# Patient Record
Sex: FEMALE | Race: WHITE | NOT HISPANIC OR LATINO | Employment: OTHER | URBAN - METROPOLITAN AREA
[De-identification: names, ages, dates, MRNs, and addresses within clinical notes are randomized per-mention and may not be internally consistent; named-entity substitution may affect disease eponyms.]

---

## 2017-06-09 ENCOUNTER — HOSPITAL ENCOUNTER (OUTPATIENT)
Dept: MAMMOGRAPHY | Facility: HOSPITAL | Age: 62
Discharge: HOME | End: 2017-06-09

## 2017-06-09 DIAGNOSIS — Z12.31 VISIT FOR SCREENING MAMMOGRAM: ICD-10-CM

## 2021-05-25 ENCOUNTER — RECORDS - HEALTHEAST (OUTPATIENT)
Dept: ADMINISTRATIVE | Facility: CLINIC | Age: 66
End: 2021-05-25

## 2021-05-26 ENCOUNTER — RECORDS - HEALTHEAST (OUTPATIENT)
Dept: ADMINISTRATIVE | Facility: CLINIC | Age: 66
End: 2021-05-26

## 2021-05-27 ENCOUNTER — RECORDS - HEALTHEAST (OUTPATIENT)
Dept: ADMINISTRATIVE | Facility: CLINIC | Age: 66
End: 2021-05-27

## 2021-05-28 ENCOUNTER — RECORDS - HEALTHEAST (OUTPATIENT)
Dept: ADMINISTRATIVE | Facility: CLINIC | Age: 66
End: 2021-05-28

## 2021-07-13 ENCOUNTER — RECORDS - HEALTHEAST (OUTPATIENT)
Dept: ADMINISTRATIVE | Facility: CLINIC | Age: 66
End: 2021-07-13

## 2021-07-21 ENCOUNTER — RECORDS - HEALTHEAST (OUTPATIENT)
Dept: ADMINISTRATIVE | Facility: CLINIC | Age: 66
End: 2021-07-21

## 2021-09-16 ENCOUNTER — HOSPITAL ENCOUNTER (OUTPATIENT)
Dept: MRI IMAGING | Facility: CLINIC | Age: 66
Discharge: HOME | End: 2021-09-16
Attending: INTERNAL MEDICINE | Admitting: INTERNAL MEDICINE
Payer: COMMERCIAL

## 2021-09-16 DIAGNOSIS — K52.9 CHRONIC COLITIS: ICD-10-CM

## 2021-09-16 PROCEDURE — 250N000011 HC RX IP 250 OP 636: Performed by: INTERNAL MEDICINE

## 2021-09-16 PROCEDURE — A9585 GADOBUTROL INJECTION: HCPCS | Performed by: INTERNAL MEDICINE

## 2021-09-16 PROCEDURE — 255N000002 HC RX 255 OP 636: Performed by: INTERNAL MEDICINE

## 2021-09-16 PROCEDURE — 74183 MRI ABD W/O CNTR FLWD CNTR: CPT

## 2021-09-16 RX ORDER — GADOBUTROL 604.72 MG/ML
8 INJECTION INTRAVENOUS ONCE
Status: COMPLETED | OUTPATIENT
Start: 2021-09-16 | End: 2021-09-16

## 2021-09-16 RX ADMIN — GADOBUTROL 8 ML: 604.72 INJECTION INTRAVENOUS at 10:02

## 2021-09-16 RX ADMIN — GLUCAGON HYDROCHLORIDE 0.5 MG: KIT at 09:24

## 2021-09-16 RX ADMIN — GLUCAGON HYDROCHLORIDE 0.5 MG: KIT at 09:59

## 2021-09-16 NOTE — IR NOTE
Patient is not diabetic. Does not have insulinoma or pheochromocytoma. Glucagon given per protocol.

## 2025-06-03 LAB
ALBUMIN SERPL BCG-MCNC: 4.6 G/DL (ref 3.5–5.2)
ALP SERPL-CCNC: 74 U/L (ref 40–150)
ALT SERPL W P-5'-P-CCNC: 37 U/L (ref 0–50)
ANION GAP SERPL CALCULATED.3IONS-SCNC: 14 MMOL/L (ref 7–15)
AST SERPL W P-5'-P-CCNC: 37 U/L (ref 0–45)
ATRIAL RATE - MUSE: 74 BPM
BASOPHILS # BLD AUTO: 0.1 10E3/UL (ref 0–0.2)
BASOPHILS NFR BLD AUTO: 1 %
BILIRUB SERPL-MCNC: 0.7 MG/DL
BUN SERPL-MCNC: 13.6 MG/DL (ref 8–23)
CALCIUM SERPL-MCNC: 10.5 MG/DL (ref 8.8–10.4)
CHLORIDE SERPL-SCNC: 97 MMOL/L (ref 98–107)
CREAT SERPL-MCNC: 0.62 MG/DL (ref 0.51–0.95)
DIASTOLIC BLOOD PRESSURE - MUSE: NORMAL MMHG
EGFRCR SERPLBLD CKD-EPI 2021: >90 ML/MIN/1.73M2
EOSINOPHIL # BLD AUTO: 0 10E3/UL (ref 0–0.7)
EOSINOPHIL NFR BLD AUTO: 1 %
ERYTHROCYTE [DISTWIDTH] IN BLOOD BY AUTOMATED COUNT: 13.2 % (ref 10–15)
GLUCOSE SERPL-MCNC: 129 MG/DL (ref 70–99)
HCO3 SERPL-SCNC: 29 MMOL/L (ref 22–29)
HCT VFR BLD AUTO: 41.9 % (ref 35–47)
HGB BLD-MCNC: 14.3 G/DL (ref 11.7–15.7)
IMM GRANULOCYTES # BLD: 0 10E3/UL
IMM GRANULOCYTES NFR BLD: 0 %
INTERPRETATION ECG - MUSE: NORMAL
LYMPHOCYTES # BLD AUTO: 1.1 10E3/UL (ref 0.8–5.3)
LYMPHOCYTES NFR BLD AUTO: 15 %
MAGNESIUM SERPL-MCNC: 1.9 MG/DL (ref 1.7–2.3)
MCH RBC QN AUTO: 31 PG (ref 26.5–33)
MCHC RBC AUTO-ENTMCNC: 34.1 G/DL (ref 31.5–36.5)
MCV RBC AUTO: 91 FL (ref 78–100)
MONOCYTES # BLD AUTO: 0.7 10E3/UL (ref 0–1.3)
MONOCYTES NFR BLD AUTO: 9 %
NEUTROPHILS # BLD AUTO: 5.4 10E3/UL (ref 1.6–8.3)
NEUTROPHILS NFR BLD AUTO: 75 %
NRBC # BLD AUTO: 0 10E3/UL
NRBC BLD AUTO-RTO: 0 /100
P AXIS - MUSE: 53 DEGREES
PLATELET # BLD AUTO: 176 10E3/UL (ref 150–450)
POTASSIUM SERPL-SCNC: 3.7 MMOL/L (ref 3.4–5.3)
PR INTERVAL - MUSE: 150 MS
PROT SERPL-MCNC: 7.8 G/DL (ref 6.4–8.3)
QRS DURATION - MUSE: 114 MS
QT - MUSE: 410 MS
QTC - MUSE: 455 MS
R AXIS - MUSE: -60 DEGREES
RBC # BLD AUTO: 4.62 10E6/UL (ref 3.8–5.2)
SODIUM SERPL-SCNC: 140 MMOL/L (ref 135–145)
SYSTOLIC BLOOD PRESSURE - MUSE: NORMAL MMHG
T AXIS - MUSE: -34 DEGREES
TROPONIN T SERPL HS-MCNC: <6 NG/L
VENTRICULAR RATE- MUSE: 74 BPM
WBC # BLD AUTO: 7.2 10E3/UL (ref 4–11)

## 2025-06-03 PROCEDURE — 99284 EMERGENCY DEPT VISIT MOD MDM: CPT

## 2025-06-03 PROCEDURE — 36415 COLL VENOUS BLD VENIPUNCTURE: CPT | Performed by: EMERGENCY MEDICINE

## 2025-06-03 PROCEDURE — 85041 AUTOMATED RBC COUNT: CPT | Performed by: EMERGENCY MEDICINE

## 2025-06-03 PROCEDURE — 84484 ASSAY OF TROPONIN QUANT: CPT | Performed by: EMERGENCY MEDICINE

## 2025-06-03 PROCEDURE — 83735 ASSAY OF MAGNESIUM: CPT | Performed by: EMERGENCY MEDICINE

## 2025-06-03 PROCEDURE — 82310 ASSAY OF CALCIUM: CPT | Performed by: EMERGENCY MEDICINE

## 2025-06-03 PROCEDURE — 93005 ELECTROCARDIOGRAM TRACING: CPT | Performed by: EMERGENCY MEDICINE

## 2025-06-03 ASSESSMENT — COLUMBIA-SUICIDE SEVERITY RATING SCALE - C-SSRS
6. HAVE YOU EVER DONE ANYTHING, STARTED TO DO ANYTHING, OR PREPARED TO DO ANYTHING TO END YOUR LIFE?: NO
1. IN THE PAST MONTH, HAVE YOU WISHED YOU WERE DEAD OR WISHED YOU COULD GO TO SLEEP AND NOT WAKE UP?: NO
2. HAVE YOU ACTUALLY HAD ANY THOUGHTS OF KILLING YOURSELF IN THE PAST MONTH?: NO

## 2025-06-04 ENCOUNTER — HOSPITAL ENCOUNTER (EMERGENCY)
Facility: CLINIC | Age: 70
Discharge: HOME | End: 2025-06-04
Attending: EMERGENCY MEDICINE | Admitting: EMERGENCY MEDICINE
Payer: COMMERCIAL

## 2025-06-04 VITALS
HEIGHT: 66 IN | DIASTOLIC BLOOD PRESSURE: 73 MMHG | TEMPERATURE: 97.8 F | SYSTOLIC BLOOD PRESSURE: 157 MMHG | RESPIRATION RATE: 25 BRPM | OXYGEN SATURATION: 96 % | WEIGHT: 191.3 LBS | HEART RATE: 65 BPM | BODY MASS INDEX: 30.74 KG/M2

## 2025-06-04 DIAGNOSIS — I10 EPISODE OF HYPERTENSION: ICD-10-CM

## 2025-06-04 LAB — HOLD SPECIMEN: NORMAL

## 2025-06-04 PROCEDURE — 250N000013 HC RX MED GY IP 250 OP 250 PS 637: Performed by: EMERGENCY MEDICINE

## 2025-06-04 RX ORDER — CLONIDINE HYDROCHLORIDE 0.1 MG/1
0.2 TABLET ORAL ONCE
Status: DISCONTINUED | OUTPATIENT
Start: 2025-06-04 | End: 2025-06-04

## 2025-06-04 RX ORDER — AMITRIPTYLINE HYDROCHLORIDE 50 MG/1
50 TABLET ORAL
COMMUNITY
Start: 2023-11-17

## 2025-06-04 RX ORDER — CLONIDINE HYDROCHLORIDE 0.1 MG/1
0.1 TABLET ORAL ONCE
Status: COMPLETED | OUTPATIENT
Start: 2025-06-04 | End: 2025-06-04

## 2025-06-04 RX ADMIN — CLONIDINE HYDROCHLORIDE 0.1 MG: 0.1 TABLET ORAL at 00:30

## 2025-06-04 ASSESSMENT — ACTIVITIES OF DAILY LIVING (ADL): ADLS_ACUITY_SCORE: 41

## 2025-06-04 NOTE — ED TRIAGE NOTES
Pt with high BP readings x past 3 days; today ranging 170-210s/90-120s. No headache or vision changes, no CP SOB or palpitations.    Recent med changes over the past few weeks: starting remicade, starting losartan, starting cymbalta, and increasing hydrochlorothiazide.     Triage Assessment (Adult)       Row Name 06/03/25 4753          Triage Assessment    Airway WDL WDL        Respiratory WDL    Respiratory WDL WDL        Skin Circulation/Temperature WDL    Skin Circulation/Temperature WDL WDL        Cardiac WDL    Cardiac WDL WDL     Cardiac Rhythm NSR        Peripheral/Neurovascular WDL    Peripheral Neurovascular WDL WDL        Cognitive/Neuro/Behavioral WDL    Cognitive/Neuro/Behavioral WDL WDL

## 2025-06-04 NOTE — ED PROVIDER NOTES
NAME: Audra Nunez  AGE: 69 year old female  YOB: 1955  MRN: [id number]  EVALUATION DATE & TIME: No admission date for patient encounter.    PCP: Swathi Mendoza    ED PROVIDER: Stef Ruffin M.D.      Chief Complaint   Patient presents with    Hypertension     FINAL IMPRESSION:  1. Episode of hypertension      MEDICAL DECISION MAKIN:37 PM I met with the patient, obtained history, performed an initial exam, and discussed options and plan for diagnostics and treatment here in the ED.   11:20 PM patient rechecked and blood pressure is coming down.  Labs were reported however blood pressure still slightly elevated and we did give a dose of clonidine.  Patient watched and blood pressure and come down after that.  If continuing to trend up we will plan for discharge and follow-up with her primary doctor tomorrow morning by phone for any adjustment of blood pressure medications.    Patient was clinically assessed and consented to treatment. After assessment, medical decision making and workup were discussed with the patient. The patient was agreeable to plan for testing, workup, and treatment.  Pertinent Labs & Imaging studies reviewed. (See chart for details)       Medical Decision Making  I reviewed the EMR: Outpatient Record: Outpatient visits from  as well as telephone encounters from  and  and May 1  Care impacted by Hypertension  I independently interpreted the EKG and note no acute ischemia. See radiology report for final interpretation.  Discharge. No recommendations on prescription strength medication(s). See documentation for any additional details.    MIPS (CTPE, Dental pain, Bejarano, Sinusitis, Asthma/COPD, Head Trauma): Not Applicable    SEPSIS: None        Audra Nunez is a 69 year old female who presents with high blood pressure.   Differential diagnosis includes but not limited to hypertensive urgency, hypertensive emergency, uncontrolled high  blood pressure, acute coronary syndrome.  Patient is a 69-year-old female with high blood pressure.  Patient recently has rising high blood pressure levels and 3 weeks ago with started on increased dose of hydrochlorothiazide as well as losartan.  Patient been taking this and blood pressures according to her recordings have been improved however over the last 2 to 3 days they have been gradually rising from 120s to 140s and then onto 160s before coming in tonight at 200s.  Patient does not recall any changes in diet or medications that may have caused this rise.  EKG was done and showed no acute ST elevation ischemia but did show an incomplete left bundle branch block.  Patient had no chest pain, no lightheadedness, no blurry vision, no headache or other symptoms at this time but does feel very anxious and jittery with the blood pressure being up.  This may be contributing somewhat but patient does report recent episode similar to this when they went up on her medications 3 weeks ago.  After discussion with patient and EKG performed patient was brought back to room and placed on monitor.  Labs were obtained that showed normal CBC, unremarkable metabolic panel and negative troponin.  Patient's findings are reassuring and given that she had no other symptoms I feel this is likely more consistent with uncontrolled high blood pressure.  Patient does not appear to have any endorgan damage or hypertensive emergency.  Blood pressure was coming down after she took her home meds here following checks of her blood pressure before.  Blood pressure down to 170s and after discussion with her we did give her a dose of clonidine.  I did consider repeat labs but patient's labs were completely normal and she was asymptomatic.  She does report with the blood pressure being controlled better she is less jittery or anxious.  This may be contributing somewhat to the elevated blood pressure but also blood pressure appears to be  uncontrolled outpatient and I would recommend she follow-up with her primary doctor to contact and possibly adjust medications again.    0 minutes of critical care time    MEDICATIONS GIVEN IN THE EMERGENCY:  Medications   cloNIDine (CATAPRES) tablet 0.1 mg (0.1 mg Oral $Given 6/4/25 0030)       NEW PRESCRIPTIONS STARTED AT TODAY'S ER VISIT:  Discharge Medication List as of 6/4/2025  1:50 AM             =================================================================    HPI    Patient information was obtained from: Patient    Use of : N/a    Audra Nunez is a 69 year old female with a past medical history of hypertension and Crohn's disease, who presents with elevated blood pressure.  Patient reports that 3 weeks ago she was seen for uncontrolled high blood pressure and her hydrochlorothiazide was increased from 25 mg to 50 mg as well patient had losartan 50 mg added on.  She reports since that time she has been recording blood pressure at least once a day if not multiple times and notes that the highest it has been since that time was maybe in the 140s systolically.  Patient however notes that 3 days ago the blood pressure went up to 140s and then subsequently on repeat days it was in the 140s to 160s.  Today however when she took it it was in the 200s.  She did recheck it again at home and is still in the 200s so came to the ER.  She denies any chest pain, no shortness of breath, no lightheadedness, no blurry vision, no nausea or vomiting, no difficulty speaking.  She reports no unilateral weakness or other mobility problems.  Patient reports that she feels jittery or anxious when her blood pressure gets up like this but does not have any other symptoms of pain or disability.  She does not report any changes in diet, no other new medications except for starting Remicade but that was started over 3 weeks ago as well.      REVIEW OF SYSTEMS   Review of Systems     PAST MEDICAL HISTORY:  No past  "medical history on file.    PAST SURGICAL HISTORY:  No past surgical history on file.    CURRENT MEDICATIONS:    No current facility-administered medications for this encounter.    Current Outpatient Medications:     amitriptyline (ELAVIL) 50 MG tablet, Take 50 mg by mouth., Disp: , Rfl:     ALLERGIES:  No Known Allergies    FAMILY HISTORY:  Family History   Problem Relation Age of Onset    Lung Cancer Father     Breast Cancer No family hx of        SOCIAL HISTORY:   Social History     Socioeconomic History    Marital status:      Social Drivers of Health     Financial Resource Strain: Low Risk  (12/5/2023)    [facility]    Financial Resource Strain     Difficulty of Paying Living Expenses: 3   Food Insecurity: No Food Insecurity (12/5/2023)    [facility]    Food Insecurity     Do you worry your food will run out before you are able to buy more?: 1   Transportation Needs: No Transportation Needs (12/5/2023)    Received from [facility]    Transportation Needs     Does lack of transportation keep you from medical appointments?: 1     Does lack of transportation keep you from work, meetings or getting things that you need?: 1   Social Connections: Socially Integrated (12/5/2023)    Received from [facility]    Social Connections     Do you often feel lonely or isolated from those around you?: 0   Housing Stability: Low Risk  (12/5/2023)    Received from [facility]    Housing Stability     What is your housing situation today?: 1       PHYSICAL EXAM:    Vitals: BP (!) 157/73   Pulse 65   Temp 97.8  F (36.6  C) (Oral)   Resp 25   Ht 1.676 m (5' 6\")   Wt 86.8 kg (191 lb 4.8 oz)   SpO2 96%   BMI 30.88 kg/m     Physical Exam  Vitals and nursing note reviewed.   Constitutional:       General: She is not in acute " distress.     Appearance: Normal appearance. She is obese. She is not ill-appearing, toxic-appearing or diaphoretic.   HENT:      Head: Normocephalic.   Eyes:      Extraocular Movements: Extraocular movements intact.      Pupils: Pupils are equal, round, and reactive to light.      Funduscopic exam:     Right eye: No papilledema.         Left eye: No papilledema.   Cardiovascular:      Rate and Rhythm: Normal rate and regular rhythm.      Heart sounds: Normal heart sounds.   Pulmonary:      Effort: Pulmonary effort is normal. No respiratory distress.      Breath sounds: Normal breath sounds.   Musculoskeletal:      Cervical back: Normal range of motion and neck supple. No rigidity.      Right lower leg: No edema.      Left lower leg: No edema.   Skin:     General: Skin is warm and dry.   Neurological:      General: No focal deficit present.      Mental Status: She is alert and oriented to person, place, and time.      Cranial Nerves: No cranial nerve deficit.      Coordination: Coordination normal.      Gait: Gait normal.   Psychiatric:         Behavior: Behavior normal.           LAB:  All pertinent labs reviewed and interpreted.  Labs Ordered and Resulted from Time of ED Arrival to Time of ED Departure   COMPREHENSIVE METABOLIC PANEL - Abnormal       Result Value    Sodium 140      Potassium 3.7      Carbon Dioxide (CO2) 29      Anion Gap 14      Urea Nitrogen 13.6      Creatinine 0.62      GFR Estimate >90      Calcium 10.5 (*)     Chloride 97 (*)     Glucose 129 (*)     Alkaline Phosphatase 74      AST 37      ALT 37      Protein Total 7.8      Albumin 4.6      Bilirubin Total 0.7     TROPONIN T, HIGH SENSITIVITY - Normal    Troponin T, High Sensitivity <6     MAGNESIUM - Normal    Magnesium 1.9     CBC WITH PLATELETS AND DIFFERENTIAL    WBC Count 7.2      RBC Count 4.62      Hemoglobin 14.3      Hematocrit 41.9      MCV 91      MCH 31.0      MCHC 34.1      RDW 13.2      Platelet Count 176      % Neutrophils 75       % Lymphocytes 15      % Monocytes 9      % Eosinophils 1      % Basophils 1      % Immature Granulocytes 0      NRBCs per 100 WBC 0      Absolute Neutrophils 5.4      Absolute Lymphocytes 1.1      Absolute Monocytes 0.7      Absolute Eosinophils 0.0      Absolute Basophils 0.1      Absolute Immature Granulocytes 0.0      Absolute NRBCs 0.0         RADIOLOGY:  No orders to display       EKG:   Performed at: 10:00 PM on Tami 3, 2025  Impression: Sinus rhythm, left axis deviation with incomplete left bundle branch block, LVH, no signs of acute ST elevation ischemia, no atrial fibrillation or irregular rhythm  Rate: 74 bpm  Rhythm: Sinus rhythm  QRS Interval: 114 ms  QTc Interval: 455 ms  Comparison: No old EKG for comparison  I have independently reviewed and interpreted the EKG(s) documented above.     PROCEDURES:   Procedures     Stef Ruffin M.D.  Emergency Medicine  [facility]       Stef Ruffin MD  06/04/25 0417

## 2025-06-04 NOTE — ED NOTES
Pt took home meds as directed  by provider>>> /99   Losartan 50 mg  Atenolol 100 mg  Rosuvastatin 20 mg  Amitryptyline 50 mg  ASA 81 mg